# Patient Record
Sex: MALE | Race: WHITE | Employment: STUDENT | URBAN - METROPOLITAN AREA
[De-identification: names, ages, dates, MRNs, and addresses within clinical notes are randomized per-mention and may not be internally consistent; named-entity substitution may affect disease eponyms.]

---
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
## 2020-01-22 DIAGNOSIS — Q55.0 VANISHING TESTES SYNDROME: ICD-10-CM

## 2020-01-23 RX ORDER — TESTOSTERONE 16.2 MG/G
GEL TRANSDERMAL
Qty: 3 BOTTLE | Refills: 1 | Status: SHIPPED | OUTPATIENT
Start: 2020-01-23 | End: 2020-02-24 | Stop reason: ALTCHOICE

## 2020-01-24 ENCOUNTER — TELEPHONE (OUTPATIENT)
Dept: PEDIATRIC ENDOCRINOLOGY | Age: 20
End: 2020-01-24

## 2020-01-28 ENCOUNTER — TELEPHONE (OUTPATIENT)
Dept: PEDIATRIC ENDOCRINOLOGY | Age: 20
End: 2020-01-28

## 2020-02-11 LAB
FSH SERPL-ACNC: 121.4 MIU/ML (ref 1.5–12.4)
LH SERPL-ACNC: 30.4 MIU/ML (ref 1.7–8.6)
TESTOST SERPL-MCNC: 25.2 NG/DL (ref 264–916)

## 2020-02-14 ENCOUNTER — DOCUMENTATION ONLY (OUTPATIENT)
Dept: PEDIATRIC ENDOCRINOLOGY | Age: 20
End: 2020-02-14

## 2020-02-14 ENCOUNTER — TELEPHONE (OUTPATIENT)
Dept: PEDIATRIC ENDOCRINOLOGY | Age: 20
End: 2020-02-14

## 2020-02-14 NOTE — PROGRESS NOTES
Elevated LH and FSH level. Low testosterone level. Family would find out if he is applying androgel everyday. Would give me a call on Monday to discuss as well as further management plan.

## 2020-02-14 NOTE — TELEPHONE ENCOUNTER
----- Message from Diamond Estevez sent at 2/14/2020  4:04 PM EST -----  Regarding: Андрей Dia  Contact: [phone]  Mom called wanting to talk to Jun Meehan again.     Please advise [phone]

## 2020-02-18 NOTE — TELEPHONE ENCOUNTER
02/18/20  3:24 PM    Nish Carreon sent to [address] [place] [address]   Phone Number: [phone]             Pt's dad is returning Dr. Clinton Patel phone call.

## 2020-02-19 NOTE — TELEPHONE ENCOUNTER
----- Message from Star Dye sent at 2/19/2020 12:04 PM EST -----  Regarding: Rhonda   Contact: [phone]  Pt called returning office call.  Please advise [phone]

## 2020-02-24 DIAGNOSIS — Q55.0 VANISHING TESTES SYNDROME: ICD-10-CM

## 2020-02-24 DIAGNOSIS — E29.1 HYPOGONADISM IN MALE: Primary | ICD-10-CM

## 2020-03-27 ENCOUNTER — CLINICAL SUPPORT (OUTPATIENT)
Dept: PEDIATRIC ENDOCRINOLOGY | Age: 20
End: 2020-03-27

## 2020-03-27 VITALS
HEIGHT: 70 IN | HEART RATE: 65 BPM | BODY MASS INDEX: 23.02 KG/M2 | RESPIRATION RATE: 20 BRPM | DIASTOLIC BLOOD PRESSURE: 75 MMHG | SYSTOLIC BLOOD PRESSURE: 118 MMHG | WEIGHT: 160.8 LBS | TEMPERATURE: 98.2 F | OXYGEN SATURATION: 99 %

## 2020-03-27 DIAGNOSIS — Q55.0 VANISHING TESTES SYNDROME: Primary | ICD-10-CM

## 2020-03-27 NOTE — PROGRESS NOTES
Chief Complaint   Patient presents with    Injection     Patient waited 10 minutes post injection. No adverse reactions noted to injection site at the time.

## 2020-04-15 DIAGNOSIS — Q55.0 VANISHING TESTES SYNDROME: ICD-10-CM

## 2020-04-15 NOTE — TELEPHONE ENCOUNTER
Testosterone cypionate 200 mg/mL \[facility]\"" [facility] # [id number] - [facility], [facility]  [phone]    Upcoming apt 4/29/2020

## 2020-04-29 ENCOUNTER — OFFICE VISIT (OUTPATIENT)
Dept: PEDIATRIC ENDOCRINOLOGY | Age: 20
End: 2020-04-29

## 2020-04-29 ENCOUNTER — TELEPHONE (OUTPATIENT)
Dept: PEDIATRIC ENDOCRINOLOGY | Age: 20
End: 2020-04-29

## 2020-04-29 VITALS
WEIGHT: 164.6 LBS | TEMPERATURE: 98.2 F | HEIGHT: 70 IN | DIASTOLIC BLOOD PRESSURE: 87 MMHG | SYSTOLIC BLOOD PRESSURE: 137 MMHG | OXYGEN SATURATION: 99 % | BODY MASS INDEX: 23.56 KG/M2 | HEART RATE: 71 BPM | RESPIRATION RATE: 20 BRPM

## 2020-04-29 DIAGNOSIS — Q55.0 VANISHING TESTES SYNDROME: Primary | ICD-10-CM

## 2020-04-29 NOTE — PROGRESS NOTES
[address] [facility] [address] [address]  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 He was present today with his father. 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 Had surgery done for prosthesis  in 2012. Started on testosterone 4years ago. Currently on androgel 20.25mg/1.25gram(1.62%); 2 pump daily. He takes it in the morning and washes his hands after each use. Most recent bone age xray done at last clinic visit at [facility] of 17yrs 9[facility] was 15yrs. Labs done in 11/2018 were significant for normal hepatic panel, normal thyroid studies, normal testosterone and elevated LH, FSH. His last visit in endocrine clinic was on 10/14/2019. Reported fatigue whilst on AndroGel. AndroGel dose was increased gradually to 4 pump presses daily. Despite this his testosterone remained low while LH and FSH remain high. Most recent labs done on 2/7/2020 significant for low testosterone of 25.2, elevated LH of 30.4 and FSH of 121.4. We subsequently switched him to testosterone injection 200 mg monthly. He is here for his third testosterone injection. Reports an improvement in energy level since switching to testosterone injection. Aside this, he has valerie well with no ER visits, major illness or hospitalizations. Denies headache,problems with peripheral vision,constipation,heat/cold intolerance,polyuria,polydipsia. Past Medical History:   Diagnosis Date    Short for age        Social History: In college  Wants to be a doctor    Review of Systems:    A comprehensive review of systems was negative except for that written in the HPI. Medications:  Current Outpatient Medications   Medication Sig    testosterone cypionate 200 mg/mL kit Inject 200 mg (1 mL) every 30 days.     testosterone cypionate 200 mg/mL kit Inject 200mg (1 mL) every 30 days. No current facility-administered medications for this visit. Allergies: Allergies   Allergen Reactions    Dairy Aid [Lactase] Nausea and Vomiting     Complete dairy allergy    Penicillins Hives           Objective:       Visit Vitals  /87 (BP 1 Location: Left arm, BP Patient Position: Sitting)   Pulse 71   Temp 98.2 °F (36.8 °C) (Oral)   Resp 20   Ht 5' 10.12\" (1.781 m)   Wt 164 lb 9.6 oz (74.7 kg)   SpO2 99%   BMI 23.54 kg/m²       Height: 57 %ile (Z= 0.18) based on CDC (Boys, 2-20 Years) Stature-for-age data based on Stature recorded on 4/29/2020. Weight: 63 %ile (Z= 0.34) based on CDC (Boys, 2-20 Years) weight-for-age data using vitals from 4/29/2020. BMI: Body mass index is 23.54 kg/m². Percentile: 57 %ile (Z= 0.17) based on CDC (Boys, 2-20 Years) BMI-for-age based on BMI available as of 4/29/2020. Change in height: relatively unchanged in 6months  Change in weight: +12.1 kg in 6months    In general, Erik Burt is alert, well-appearing and in no acute distress. HEENT: normocephalic, atraumatic. Pupils are equal, round and reactive to light. Extraocular movements are intact, fundi are sharp bilaterally. Dentition is appropriate for age. Oropharynx is clear, mucous membranes moist. Neck is supple without lymphadenopathy. Thyroid is smooth and not enlarged. Chest: Clear to auscultation bilaterally. CV: Normal S1/S2 without murmur. Abdomen is soft, nontender, nondistended, no hepatosplenomegaly. Skin is warm, without rash or macules. Extremities are within normal. Neuro demonstrates 2+ patellar reflexes bilaterally. Sexual development: stage soco 4 PH.   Breast exam: More of lipomastia with minimal breast tissue    Laboratory data:  Results for orders placed or performed in visit on 83/00/05   FOLLICLE STIMULATING HORMONE   Result Value Ref Range    .4 (H) 1.5 - 12.4 mIU/mL   LUTEINIZING HORMONE   Result Value Ref Range    Luteinizing hormone 30.4 (H) 1.7 - 8.6 mIU/mL   TESTOSTERONE, TOTAL, FEMALE/CHILD   Result Value Ref Range    Testosterone, Serum (Total) 25.2 (L) 264.0 - 916.0 ng/dL        Assessment:       Jesica Huerta is a 23 y.o. male presenting for follow up of vanishing testes syndrome. Labs from 2/2020 showed low testosterone with elevated LH/FSH. Switch from AndroGel to testosterone injection on account of fatigue and low testosterone levels in February 2020. Currently on testosterone injection 200 mg monthly. Reports an improvement in energy level since starting testosterone injection. We will send repeat labs today. Meantime will continue testosterone injection 200 mg monthly. Will call family to discuss the results of these labs as well as further management plan. We discussed options for other topical testosterone as the hormone levels improve. We will like to see him back in clinic in 4 months or sooner if any concerns. Plan discussed with Jesica Huerta and dad who verbalized understanding. Fertility: Further discussion at next clinic visit       Plan:   Reviewed growth charts with family  Diagnosis, etiology, pathophysiology, risk/ benefits of rx, proposed eval, and expected follow up discussed with family and all questions answered    RTC in 4months or sooner if any concerns  Orders Placed This Encounter    THER/PROPH/DIAG INJECTION, SUBCUT/IM    LUTEINIZING HORMONE    TESTOSTERONE, TOTAL, FEMALE/CHILD    FOLLICLE STIMULATING HORMONE    testosterone cypionate 200 mg/mL kit     Sig: Inject 200 mg (1 mL) every 30 days.      Dispense:  1 Kit     Refill:  0     Order Specific Question:   Site     Answer:   LEFT DELTOID     Order Specific Question:   Expiration Date     Answer:   12/31/2021     Order Specific Question:   Lot#     Answer:   Q-     Order Specific Question:        Answer:   Almita Cruz     Order Specific Question:   NDC#     Answer:   [id number]     Order Specific Question:   Route     Answer:   IM         Total time: 40minutes  Time spent counseling patient/family: 50%

## 2020-04-29 NOTE — PROGRESS NOTES
Chief Complaint   Patient presents with    Other     Puberty- Injection     Patient waited 10 minutes post injection. No adverse reactions noted to injection site at the time.

## 2020-05-01 LAB
FSH SERPL-ACNC: 91.2 MIU/ML (ref 1.5–12.4)
LH SERPL-ACNC: 35.1 MIU/ML (ref 1.7–8.6)
TESTOST SERPL-MCNC: 548.8 NG/DL (ref 264–916)

## 2020-05-04 DIAGNOSIS — Q55.0 VANISHING TESTES SYNDROME: ICD-10-CM

## 2020-05-20 ENCOUNTER — CLINICAL SUPPORT (OUTPATIENT)
Dept: PEDIATRIC ENDOCRINOLOGY | Age: 20
End: 2020-05-20

## 2020-05-20 VITALS
WEIGHT: 168.4 LBS | OXYGEN SATURATION: 95 % | SYSTOLIC BLOOD PRESSURE: 123 MMHG | HEIGHT: 70 IN | BODY MASS INDEX: 24.11 KG/M2 | RESPIRATION RATE: 18 BRPM | TEMPERATURE: 97.2 F | HEART RATE: 61 BPM | DIASTOLIC BLOOD PRESSURE: 72 MMHG

## 2020-05-20 DIAGNOSIS — Q55.0 VANISHING TESTES SYNDROME: Primary | ICD-10-CM

## 2020-05-20 NOTE — PROGRESS NOTES
Chief Complaint   Patient presents with    Other     testosterone injection only      Patient tolerated well. No reactions noted at this time. Patient waited 10 min post injection.

## 2020-06-10 ENCOUNTER — CLINICAL SUPPORT (OUTPATIENT)
Dept: PEDIATRIC ENDOCRINOLOGY | Age: 20
End: 2020-06-10

## 2020-06-10 VITALS
RESPIRATION RATE: 20 BRPM | DIASTOLIC BLOOD PRESSURE: 86 MMHG | BODY MASS INDEX: 23.65 KG/M2 | HEIGHT: 70 IN | SYSTOLIC BLOOD PRESSURE: 131 MMHG | WEIGHT: 165.2 LBS | HEART RATE: 85 BPM | OXYGEN SATURATION: 99 % | TEMPERATURE: 98.3 F

## 2020-06-10 DIAGNOSIS — Q55.0 VANISHING TESTES SYNDROME: Primary | ICD-10-CM

## 2020-06-10 NOTE — PROGRESS NOTES
Chief Complaint   Patient presents with    Injection     Patient waited 15 minutes post injection. No adverse reaction noted to injection site at the time.

## 2020-06-24 ENCOUNTER — HOSPITAL ENCOUNTER (EMERGENCY)
Age: 20
Discharge: HOME | End: 2020-06-25
Attending: EMERGENCY MEDICINE
Payer: COMMERCIAL

## 2020-06-24 DIAGNOSIS — S93.601A SPRAIN OF RIGHT FOOT, INITIAL ENCOUNTER: ICD-10-CM

## 2020-06-24 DIAGNOSIS — M79.671 RIGHT FOOT PAIN: Primary | ICD-10-CM

## 2020-06-24 PROCEDURE — 99283 EMERGENCY DEPT VISIT LOW MDM: CPT

## 2020-06-24 RX ORDER — ACETAMINOPHEN 500 MG
1000 TABLET ORAL
Status: COMPLETED | OUTPATIENT
Start: 2020-06-25 | End: 2020-06-25

## 2020-06-24 RX ORDER — IBUPROFEN 400 MG/1
400 TABLET ORAL
Status: COMPLETED | OUTPATIENT
Start: 2020-06-25 | End: 2020-06-25

## 2020-06-25 ENCOUNTER — APPOINTMENT (OUTPATIENT)
Dept: GENERAL RADIOLOGY | Age: 20
End: 2020-06-25
Attending: EMERGENCY MEDICINE
Payer: COMMERCIAL

## 2020-06-25 VITALS
DIASTOLIC BLOOD PRESSURE: 80 MMHG | RESPIRATION RATE: 20 BRPM | SYSTOLIC BLOOD PRESSURE: 140 MMHG | TEMPERATURE: 98 F | HEART RATE: 70 BPM | OXYGEN SATURATION: 100 %

## 2020-06-25 PROCEDURE — 73630 X-RAY EXAM OF FOOT: CPT

## 2020-06-25 PROCEDURE — 74011250637 HC RX REV CODE- 250/637: Performed by: EMERGENCY MEDICINE

## 2020-06-25 PROCEDURE — 74011250637 HC RX REV CODE- 250/637

## 2020-06-25 RX ORDER — IBUPROFEN 400 MG/1
TABLET ORAL
Status: COMPLETED
Start: 2020-06-25 | End: 2020-06-25

## 2020-06-25 RX ORDER — ACETAMINOPHEN 500 MG
TABLET ORAL
Status: DISCONTINUED
Start: 2020-06-25 | End: 2020-06-25 | Stop reason: HOSPADM

## 2020-06-25 RX ORDER — IBUPROFEN 200 MG
TABLET ORAL
Status: DISCONTINUED
Start: 2020-06-25 | End: 2020-06-25 | Stop reason: HOSPADM

## 2020-06-25 RX ADMIN — IBUPROFEN 400 MG: 400 TABLET, FILM COATED ORAL at 00:10

## 2020-06-25 RX ADMIN — ACETAMINOPHEN 1000 MG: 500 TABLET ORAL at 00:07

## 2020-06-25 RX ADMIN — IBUPROFEN 400 MG: 400 TABLET ORAL at 00:10

## 2020-06-25 NOTE — ED TRIAGE NOTES
C/o of R. Foot pain R. Lateral post tripping over family dog. ? Swelling R. Lateral side of R. Foot. Pt. Ambulatory with slight limp.

## 2020-06-25 NOTE — ED PROVIDER NOTES
40-year-old male presenting to the ER with complaint of right foot pain. Patient reports before presenting to the ER he was tripped up by his dog causing him to hyper plantarflex the foot causing severe pain in the middle and lateral midfoot. Patient reports worsened pain with palpation and bearing weight. No numbness tingling weakness. No previous injuries. Patient denies any ankle pain or lower leg pain.   She took 400 mg of Motrin prior to arrival.           Past Medical History:   Diagnosis Date    Short for age        Past Surgical History:   Procedure Laterality Date    COLONOSCOPY N/A 11/21/2018    COLONOSCOPY performed by Miriam Li MD at [address] HX OTHER SURGICAL  4/2/13    sinus sx    HX OTHER SURGICAL      implant         Family History:   Problem Relation Age of Onset    No Known Problems Mother     No Known Problems Father     Hypertension Maternal Grandfather     Heart Disease Paternal Grandfather     Asthma Brother     Cancer Paternal Grandmother        Social History     Socioeconomic History    Marital status: SINGLE     Spouse name: Not on file    Number of children: Not on file    Years of education: Not on file    Highest education level: Not on file   Occupational History    Not on file   Social Needs    Financial resource strain: Not on file    Food insecurity     Worry: Not on file     Inability: Not on file    Transportation needs     Medical: Not on file     Non-medical: Not on file   Tobacco Use    Smoking status: Never Smoker    Smokeless tobacco: Never Used   Substance and Sexual Activity    Alcohol use: No    Drug use: No    Sexual activity: Never   Lifestyle    Physical activity     Days per week: Not on file     Minutes per session: Not on file    Stress: Not on file   Relationships    Social connections     Talks on phone: Not on file     Gets together: Not on file     Attends [employer] service: Not on file     Active member of club or organization: Not on file     Attends meetings of clubs or organizations: Not on file     Relationship status: Not on file    Intimate partner violence     Fear of current or ex partner: Not on file     Emotionally abused: Not on file     Physically abused: Not on file     Forced sexual activity: Not on file   Other Topics Concern    Not on file   Social History Narrative    Not on file         ALLERGIES: Dairy aid [lactase] and Penicillins    Review of Systems   Musculoskeletal: Positive for joint swelling. Neurological: Negative for weakness and numbness. All other systems reviewed and are negative. Vitals:    06/24/20 2359   BP: 140/80   Pulse: 70   Resp: 20   Temp: 98 °F (36.7 °C)   SpO2: 100%            Physical Exam  Constitutional:       General: He is not in acute distress. Appearance: Normal appearance. HENT:      Head: Normocephalic. Nose: Nose normal.   Eyes:      Conjunctiva/sclera: Conjunctivae normal.   Neck:      Musculoskeletal: Neck supple. Cardiovascular:      Rate and Rhythm: Normal rate. Pulmonary:      Effort: Pulmonary effort is normal. No respiratory distress. Musculoskeletal:         General: Tenderness and signs of injury present. Right ankle: He exhibits normal range of motion, no swelling, no ecchymosis, no deformity and normal pulse. No tenderness. No lateral malleolus, no medial malleolus, no AITFL, no CF ligament, no posterior TFL and no proximal fibula tenderness found. Achilles tendon normal.      Right lower leg: Normal.      Right foot: Tenderness, bony tenderness and swelling present. No laceration. Feet:    Skin:     General: Skin is warm. Neurological:      General: No focal deficit present. Mental Status: He is alert. MDM  Number of Diagnoses or Management Options  Right foot pain:   Sprain of right foot, initial encounter:   Diagnosis management comments: Mechanism of injury consistent with likely sprain.   X-ray negative for any acute fractures. Ace wrap the area discussed rest, ice, elevation and compression. Discussed Tylenol Motrin for pain relief. Given follow-up information for orthopedics if pain does not improve after swelling has resolved. Amount and/or Complexity of Data Reviewed  Tests in the radiology section of CPT®: reviewed           Procedures      No results found for this or any previous visit (from the past 24 hour(s)). Xr Foot Rt Min 3 V    Result Date: 6/25/2020  EXAM: XR FOOT RT MIN 3 V INDICATION: injury. Pain in the lateral foot COMPARISON: None. FINDINGS: Three views of the right foot demonstrate no fracture or other acute osseous or articular abnormality. The soft tissues are within normal limits. IMPRESSION: No acute abnormality.

## 2020-06-25 NOTE — ED NOTES
I have reviewed discharge instructions with the patient. The patient verbalized understanding. Ace wrap applied to R. Foot/ankle. Pt ambulatory to vehicle. nad.

## 2020-07-15 ENCOUNTER — TELEPHONE (OUTPATIENT)
Dept: PEDIATRIC ENDOCRINOLOGY | Age: 20
End: 2020-07-15

## 2020-07-15 NOTE — TELEPHONE ENCOUNTER
----- Message from Heriberto Serrato sent at 7/15/2020  9:23 AM EDT -----  Regarding: Dr. Aceves Mess: [phone]  Dad called , had to cancel appt for today for injection for the patient. Dad would like a call back to reschedule for another day. Dad can be reached at [phone]. Does he need to see you too?

## 2020-07-17 ENCOUNTER — TELEPHONE (OUTPATIENT)
Dept: PEDIATRIC ENDOCRINOLOGY | Age: 20
End: 2020-07-17

## 2020-07-17 NOTE — TELEPHONE ENCOUNTER
Father will be giving testosterone injection at home- Sydney Ardon aware. Father reported they have a 23G needle. Parents aware of needle being slightly bigger than the one we use but will be fine for an IM injection. Father will call back if he has any questions/ concerns.

## 2020-07-17 NOTE — TELEPHONE ENCOUNTER
----- Message from Ny Simon sent at 7/17/2020 10:03 AM EDT -----  Regarding: Kelly Portillo: [phone]  Dad called to speak with nurse regarding needing to know what kind of needle needs to be used to administer testosterone injection.  please advise [phone]

## 2020-09-20 DIAGNOSIS — Q55.0 VANISHING TESTES SYNDROME: ICD-10-CM

## 2020-09-21 RX ORDER — TESTOSTERONE CYPIONATE 200 MG/ML
INJECTION INTRAMUSCULAR
Qty: 1 ML | Refills: 1 | Status: SHIPPED | OUTPATIENT
Start: 2020-09-21 | End: 2020-10-20 | Stop reason: SDUPTHER

## 2020-10-20 DIAGNOSIS — Q55.0 VANISHING TESTES SYNDROME: ICD-10-CM

## 2020-10-20 RX ORDER — TESTOSTERONE CYPIONATE 200 MG/ML
INJECTION INTRAMUSCULAR
Qty: 1 ML | Refills: 1 | Status: SHIPPED | OUTPATIENT
Start: 2020-10-20 | End: 2020-12-21 | Stop reason: SDUPTHER

## 2020-12-21 DIAGNOSIS — Q55.0 VANISHING TESTES SYNDROME: ICD-10-CM

## 2020-12-21 RX ORDER — TESTOSTERONE CYPIONATE 200 MG/ML
INJECTION INTRAMUSCULAR
Qty: 1 ML | Refills: 2 | Status: SHIPPED | OUTPATIENT
Start: 2020-12-21 | End: 2021-01-15 | Stop reason: SDUPTHER

## 2021-01-15 ENCOUNTER — TELEPHONE (OUTPATIENT)
Dept: PEDIATRIC ENDOCRINOLOGY | Age: 21
End: 2021-01-15

## 2021-01-15 DIAGNOSIS — Q55.0 VANISHING TESTES SYNDROME: ICD-10-CM

## 2021-01-15 RX ORDER — PEN NEEDLE, DIABETIC 31 GX3/16"
NEEDLE, DISPOSABLE MISCELLANEOUS
Qty: 10 PEN NEEDLE | Refills: 1 | Status: SHIPPED | OUTPATIENT
Start: 2021-01-15

## 2021-01-15 RX ORDER — TESTOSTERONE CYPIONATE 200 MG/ML
INJECTION INTRAMUSCULAR
Qty: 1 ML | Refills: 2 | Status: SHIPPED | OUTPATIENT
Start: 2021-01-15 | End: 2021-04-22 | Stop reason: SDUPTHER

## 2021-01-15 NOTE — TELEPHONE ENCOUNTER
----- Message from Blake Rock sent at 1/15/2021 12:43 PM EST -----  Regarding: Evelyn Webber: [phone]  Zaira called for PA for testosterone cypionate (DEPOTESTOTERONE CYPIONATE) 200 mg/mL injection [[id number]] going to [place] [address] #[id number] - [facility], [address] [place] [address]   Phone:  [phone]  Fax:  [phone]   ROQUE #:  [id number]      Please also include order for needles. Please call azira when completed [phone]    New insurance added in chart.

## 2021-01-15 NOTE — TELEPHONE ENCOUNTER
PA pending via [facility]. Key: [id number] - PA Case ID: [id number] - Rx #: [id number].

## 2021-04-22 DIAGNOSIS — Q55.0 VANISHING TESTES SYNDROME: ICD-10-CM

## 2021-04-22 RX ORDER — TESTOSTERONE CYPIONATE 200 MG/ML
INJECTION INTRAMUSCULAR
Qty: 1 ML | Refills: 2 | Status: SHIPPED | OUTPATIENT
Start: 2021-04-22 | End: 2021-07-19

## 2021-04-22 NOTE — TELEPHONE ENCOUNTER
Dad called requesting a refill on the pt's  Testosterone medication. Dad would also like a lab slip sent to him for the brother Chika Soler-03/19/2007.

## 2021-07-19 DIAGNOSIS — Q55.0 VANISHING TESTES SYNDROME: ICD-10-CM

## 2021-07-19 RX ORDER — TESTOSTERONE CYPIONATE 200 MG/ML
INJECTION INTRAMUSCULAR
Qty: 1 ML | Refills: 3 | Status: SHIPPED | OUTPATIENT
Start: 2021-07-19 | End: 2021-09-20

## 2021-09-20 DIAGNOSIS — Q55.0 VANISHING TESTES SYNDROME: ICD-10-CM

## 2021-09-20 RX ORDER — TESTOSTERONE CYPIONATE 200 MG/ML
INJECTION INTRAMUSCULAR
Qty: 1 ML | Refills: 3 | Status: SHIPPED | OUTPATIENT
Start: 2021-09-20 | End: 2021-10-22 | Stop reason: SDUPTHER

## 2021-09-27 ENCOUNTER — TELEPHONE (OUTPATIENT)
Dept: PEDIATRIC GASTROENTEROLOGY | Age: 21
End: 2021-09-27

## 2021-09-27 NOTE — TELEPHONE ENCOUNTER
Per Ewelina Rubalcava, patient needing to be seen. Mother reported patient being at [facility] appt scheduled for 10/4. Family verbalized understanding.

## 2021-09-27 NOTE — TELEPHONE ENCOUNTER
Mom Margo Mendieta called to speak with nurse regarding pt testosterone shot pt is having a bad face break out. Please advise [phone].

## 2021-10-04 ENCOUNTER — VIRTUAL VISIT (OUTPATIENT)
Dept: PEDIATRIC ENDOCRINOLOGY | Age: 21
End: 2021-10-04
Payer: COMMERCIAL

## 2021-10-04 DIAGNOSIS — Q55.0 VANISHING TESTES SYNDROME: Primary | ICD-10-CM

## 2021-10-04 PROCEDURE — 99215 OFFICE O/P EST HI 40 MIN: CPT | Performed by: STUDENT IN AN ORGANIZED HEALTH CARE EDUCATION/TRAINING PROGRAM

## 2021-10-04 RX ORDER — TRETINOIN 0.25 MG/G
CREAM TOPICAL
COMMUNITY
Start: 2021-07-19

## 2021-10-04 NOTE — PROGRESS NOTES
Identified pt with two pt identifiers(name and ). Reviewed record in preparation for visit and have obtained necessary documentation. Chief Complaint   Patient presents with    Follow-up   Patient reported acne for the last year- has increased in the last couple months. Last Testosterone injection: ~2 week ago. Health Maintenance Due   Topic    Hepatitis C Screening     HPV Age 9Y-34Y (3 - Male 2-dose series)    COVID-19 Vaccine (1)    DTaP/Tdap/Td series (1 - Tdap)    Flu Vaccine (1)      There were no vitals taken for this visit. Pain Scale: /10    Coordination of Care Questionnaire:  :   1. Have you been to the ER, urgent care clinic since your last visit? Hospitalized since your last visit? No    2. Have you seen or consulted any other health care providers outside of the [address] [place] [address] since your last visit? Include any pap smears or colon screening.  No

## 2021-10-04 NOTE — PROGRESS NOTES
Ciara Singleton is a 24 y.o. male who was seen by synchronous (real-time) audio-video technology on 10/4/2021 for Follow-up        Assessment & Plan:   Diagnoses and all orders for this visit:    1. Vanishing testes syndrome  -     FOLLICLE STIMULATING HORMONE; Future  -     LUTEINIZING HORMONE; Future  -     TESTOSTERONE, TOTAL, FEMALE/CHILD; Future  -     CBC WITH AUTOMATED DIFF; Future  -     METABOLIC PANEL, COMPREHENSIVE; Future    oZe Garcia is a 24 y.o. male presenting for follow up of vanishing testes syndrome. Labs from 2/2020 showed low testosterone with elevated LH/FSH. Switch from AndroGel to testosterone injection on account of fatigue and low testosterone levels in February 2020. Currently on testosterone injection 200 mg monthly. Reports an improvement in energy level since starting testosterone injection. We will send repeat labs today to be done few days before the next testosterone injection. We will give him a call to discuss the results as well as further management plan. Meantime will continue testosterone injection 200 mg monthly. We discussed options for other topical testosterone as the hormone levels improve. We will like to see him back in clinic in 4 months or sooner if any concerns. We discussed adult endocrinology follow-up normal he is turned 21yrs. Plan discussed with Zoe Garcia who verbalized understanding. We will discuss fertility options prior to transitioning to adult endocrinology. I spent at least 40 minutes on this visit with this established patient. 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 He was present today alone     Mum reports he was diagnosed with vanishing testes at 10onths of age. Had surgery done for prosthesis  in 2012. Started on testosterone 4years ago.  Was on androgel 20.25mg/1.25gram(1.62%); 2 pump daily. Bone age xray done at last clinic visit at [facility] of 17yrs 9[facility] was 15yrs. Labs done in 11/2018 were significant for normal hepatic panel, normal thyroid studies, normal testosterone and elevated LH, FSH. He was transitioned to AndroGel when he turned 18 years. Initially was on 2 pump presses. Reported fatigue. AndroGel dose was increased gradually to 4 pump presses daily. Despite this his testosterone remained low while LH and FSH remain high. Labs done on 2/7/2020 significant for low testosterone of 25.2, elevated LH of 30.4 and FSH of 121.4. We subsequently switched him to testosterone injection 200 mg monthly.      His last visit in endocrine clinic was on 4/29/2020. Labs done on 4/30/2020 significant for improved testosterone as well as [address] level. Was seen in the ER in June 2020 for leg pain. Aside this, he has valerie well with no ER visits, major illness or hospitalizations. Currently on testosterone 200 mg every 30 days. Reports improvement in energy levels. Denies headache,problems with peripheral vision,constipation,heat/cold intolerance,polyuria,polydipsia.        Prior to Admission medications    Medication Sig Start Date End Date Taking? Authorizing Provider   tretinoin (RETIN-A) 0.025 % topical cream APPLY TOPICALLY TO FACE EVERY DAY AT BEDTIME 7/19/21  Yes Provider, Historical   testosterone cypionate (DEPOTESTOTERONE CYPIONATE) 200 mg/mL injection ADMINISTER 1 ML IN THE MUSCLE EVERY 30 DAYS 9/20/21  Yes Cedric Lee MD   Insulin Needles, Disposable, (Karla Pen Needle) 32 gauge x 5/32\" ndle Use to inject testosterone once every 30 days.  1/15/21  Yes Cedric Lee MD     Patient Active Problem List   Diagnosis Code    Vanishing testes syndrome Q55.0    Unspecified sinusitis (chronic) J32.9    Gynecomastia, male N58    Weight loss R63.4     Patient Active Problem List    Diagnosis Date Noted    Weight loss 11/16/2018    Gynecomastia, male 06/26/2018    Unspecified sinusitis (chronic) 04/02/2013    Vanishing testes syndrome 03/18/2013     Current Outpatient Medications   Medication Sig Dispense Refill    tretinoin (RETIN-A) 0.025 % topical cream APPLY TOPICALLY TO FACE EVERY DAY AT BEDTIME      testosterone cypionate (DEPOTESTOTERONE CYPIONATE) 200 mg/mL injection ADMINISTER 1 ML IN THE MUSCLE EVERY 30 DAYS 1 mL 3    Insulin Needles, Disposable, (Karla Pen Needle) 32 gauge x 5/32\" ndle Use to inject testosterone once every 30 days.  10 Pen Needle 1     Allergies   Allergen Reactions    Dairy Aid [Lactase] Nausea and Vomiting     Complete dairy allergy    Penicillins Hives     Past Medical History:   Diagnosis Date    Short for age      Past Surgical History:   Procedure Laterality Date    COLONOSCOPY N/A 11/21/2018    COL[facility]COPY    HX OTHER SURGICAL  4/2/13    sinus sx    HX OTHER SURGICAL      implant     Family History   Problem Relation Age of Onset    No Known Problems Mother     No Known Problems Father     Hypertension Maternal Grandfather     Heart Disease Paternal Grandfather     Asthma Brother     Cancer Paternal Grandmother        ROS  As above  Objective:     Patient-Reported Vitals 10/4/2021   Patient-Reported Weight 160lbs        [INSTRUCTIONS:  \"[x]\" Indicates a positive item  \"[]\" Indicates a negative item  -- DELETE ALL ITEMS NOT EXAMINED]    Constitutional: [x] Appears well-developed and well-nourished [x] No apparent distress      [] Abnormal -     Mental status: [x] Alert and awake  [x] Oriented to person/place/time [x] Able to follow commands    [] Abnormal -     Eyes:   EOM    [x]  Normal    [] Abnormal -   Sclera  [x]  Normal    [] Abnormal -          Discharge [x]  None visible   [] Abnormal -     HENT: [x] Normocephalic, atraumatic  [] Abnormal -   [x] Mouth/Throat: Mucous membranes are moist    External Ears [x] Normal  [] Abnormal -    Neck: [x] No visualized mass [] Abnormal -     Pulmonary/Chest: [x] Respiratory effort normal   [x] No visualized signs of difficulty breathing or respiratory distress        [] Abnormal -      Musculoskeletal:   [x] Normal gait with no signs of ataxia         [x] Normal range of motion of neck        [] Abnormal -     Neurological:        [x] No Facial Asymmetry (Cranial nerve 7 motor function) (limited exam due to video visit)          [x] No gaze palsy        [] Abnormal -          Skin:        [x] No significant exanthematous lesions or discoloration noted on facial skin         [] Abnormal -            Psychiatric:       [x] Normal Affect [] Abnormal -        [x] No Hallucinations    Other pertinent observable physical exam findings:-        We discussed the expected course, resolution and complications of the diagnosis(es) in detail. Medication risks, benefits, costs, interactions, and alternatives were discussed as indicated. I advised him to contact the office if his condition worsens, changes or fails to improve as anticipated. He expressed understanding with the diagnosis(es) and plan. Fernanda Hill, was evaluated through a synchronous (real-time) audio-video encounter. The patient (or guardian if applicable) is aware that this is a billable service. Verbal consent to proceed has been obtained within the past 12 months. The visit was conducted pursuant to the emergency declaration under the [facility], [address] [place] [address] authority and the [employer] General Act. Patient identification was verified, and a caregiver was present when appropriate. The patient was located in a state where the provider was credentialed to provide care.       Erich Donahue MD

## 2021-10-15 LAB
ALBUMIN SERPL-MCNC: 5 G/DL (ref 4.1–5.2)
ALBUMIN/GLOB SERPL: 2.1 {RATIO} (ref 1.2–2.2)
ALP SERPL-CCNC: 84 IU/L (ref 44–121)
ALT SERPL-CCNC: 21 IU/L (ref 0–44)
AST SERPL-CCNC: 22 IU/L (ref 0–40)
BASOPHILS # BLD AUTO: 0 X10E3/UL (ref 0–0.2)
BASOPHILS NFR BLD AUTO: 0 %
BILIRUB SERPL-MCNC: 0.4 MG/DL (ref 0–1.2)
BUN SERPL-MCNC: 7 MG/DL (ref 6–20)
BUN/CREAT SERPL: 10 (ref 9–20)
CALCIUM SERPL-MCNC: 9.9 MG/DL (ref 8.7–10.2)
CHLORIDE SERPL-SCNC: 106 MMOL/L (ref 96–106)
CO2 SERPL-SCNC: 23 MMOL/L (ref 20–29)
CREAT SERPL-MCNC: 0.67 MG/DL (ref 0.76–1.27)
EOSINOPHIL # BLD AUTO: 0.2 X10E3/UL (ref 0–0.4)
EOSINOPHIL NFR BLD AUTO: 3 %
ERYTHROCYTE [DISTWIDTH] IN BLOOD BY AUTOMATED COUNT: 13.1 % (ref 11.6–15.4)
FSH SERPL-ACNC: 110 MIU/ML (ref 1.5–12.4)
GLOBULIN SER CALC-MCNC: 2.4 G/DL (ref 1.5–4.5)
GLUCOSE SERPL-MCNC: 92 MG/DL (ref 65–99)
HCT VFR BLD AUTO: 46.6 % (ref 37.5–51)
HGB BLD-MCNC: 15.1 G/DL (ref 13–17.7)
IMM GRANULOCYTES # BLD AUTO: 0 X10E3/UL (ref 0–0.1)
IMM GRANULOCYTES NFR BLD AUTO: 1 %
LH SERPL-ACNC: 44.4 MIU/ML (ref 1.7–8.6)
LYMPHOCYTES # BLD AUTO: 2.3 X10E3/UL (ref 0.7–3.1)
LYMPHOCYTES NFR BLD AUTO: 30 %
MCH RBC QN AUTO: 30.3 PG (ref 26.6–33)
MCHC RBC AUTO-ENTMCNC: 32.4 G/DL (ref 31.5–35.7)
MCV RBC AUTO: 94 FL (ref 79–97)
MONOCYTES # BLD AUTO: 0.7 X10E3/UL (ref 0.1–0.9)
MONOCYTES NFR BLD AUTO: 10 %
NEUTROPHILS # BLD AUTO: 4.2 X10E3/UL (ref 1.4–7)
NEUTROPHILS NFR BLD AUTO: 56 %
PLATELET # BLD AUTO: 235 X10E3/UL (ref 150–450)
POTASSIUM SERPL-SCNC: 4.1 MMOL/L (ref 3.5–5.2)
PROT SERPL-MCNC: 7.4 G/DL (ref 6–8.5)
RBC # BLD AUTO: 4.98 X10E6/UL (ref 4.14–5.8)
SODIUM SERPL-SCNC: 143 MMOL/L (ref 134–144)
TESTOST SERPL-MCNC: 187 NG/DL (ref 264–916)
WBC # BLD AUTO: 7.5 X10E3/UL (ref 3.4–10.8)

## 2021-10-22 DIAGNOSIS — Q55.0 VANISHING TESTES SYNDROME: Primary | ICD-10-CM

## 2021-10-22 DIAGNOSIS — Q55.0 VANISHING TESTES SYNDROME: ICD-10-CM

## 2021-10-22 RX ORDER — TESTOSTERONE CYPIONATE 200 MG/ML
INJECTION INTRAMUSCULAR
Qty: 2 ML | Refills: 3 | Status: SHIPPED | OUTPATIENT
Start: 2021-10-22 | End: 2022-01-17 | Stop reason: SDUPTHER

## 2021-10-22 NOTE — TELEPHONE ENCOUNTER
Elevated LH and FSH level. Low normal testosterone level. Plan will be to increase testosterone dose to 200 mg every 3 weeks. Plan for repeat labs in 2 months or sooner if any concerns.

## 2021-12-24 LAB
ALBUMIN SERPL-MCNC: 5.1 G/DL (ref 4.1–5.2)
ALBUMIN/GLOB SERPL: 2 {RATIO} (ref 1.2–2.2)
ALP SERPL-CCNC: 85 IU/L (ref 44–121)
ALT SERPL-CCNC: 21 IU/L (ref 0–44)
AST SERPL-CCNC: 26 IU/L (ref 0–40)
BASOPHILS # BLD AUTO: 0.1 X10E3/UL (ref 0–0.2)
BASOPHILS NFR BLD AUTO: 1 %
BILIRUB SERPL-MCNC: 0.4 MG/DL (ref 0–1.2)
BUN SERPL-MCNC: 11 MG/DL (ref 6–20)
BUN/CREAT SERPL: 15 (ref 9–20)
CALCIUM SERPL-MCNC: 10.1 MG/DL (ref 8.7–10.2)
CHLORIDE SERPL-SCNC: 104 MMOL/L (ref 96–106)
CO2 SERPL-SCNC: 26 MMOL/L (ref 20–29)
CREAT SERPL-MCNC: 0.73 MG/DL (ref 0.76–1.27)
EOSINOPHIL # BLD AUTO: 0.4 X10E3/UL (ref 0–0.4)
EOSINOPHIL NFR BLD AUTO: 6 %
ERYTHROCYTE [DISTWIDTH] IN BLOOD BY AUTOMATED COUNT: 12.4 % (ref 11.6–15.4)
FSH SERPL-ACNC: 109 MIU/ML (ref 1.5–12.4)
GLOBULIN SER CALC-MCNC: 2.6 G/DL (ref 1.5–4.5)
GLUCOSE SERPL-MCNC: 74 MG/DL (ref 65–99)
HCT VFR BLD AUTO: 42.4 % (ref 37.5–51)
HGB BLD-MCNC: 14.5 G/DL (ref 13–17.7)
IMM GRANULOCYTES # BLD AUTO: 0 X10E3/UL (ref 0–0.1)
IMM GRANULOCYTES NFR BLD AUTO: 0 %
LH SERPL-ACNC: 39.8 MIU/ML (ref 1.7–8.6)
LYMPHOCYTES # BLD AUTO: 2 X10E3/UL (ref 0.7–3.1)
LYMPHOCYTES NFR BLD AUTO: 29 %
MCH RBC QN AUTO: 31.5 PG (ref 26.6–33)
MCHC RBC AUTO-ENTMCNC: 34.2 G/DL (ref 31.5–35.7)
MCV RBC AUTO: 92 FL (ref 79–97)
MONOCYTES # BLD AUTO: 0.7 X10E3/UL (ref 0.1–0.9)
MONOCYTES NFR BLD AUTO: 11 %
NEUTROPHILS # BLD AUTO: 3.5 X10E3/UL (ref 1.4–7)
NEUTROPHILS NFR BLD AUTO: 53 %
PLATELET # BLD AUTO: 278 X10E3/UL (ref 150–450)
POTASSIUM SERPL-SCNC: 3.7 MMOL/L (ref 3.5–5.2)
PROT SERPL-MCNC: 7.7 G/DL (ref 6–8.5)
RBC # BLD AUTO: 4.61 X10E6/UL (ref 4.14–5.8)
SODIUM SERPL-SCNC: 143 MMOL/L (ref 134–144)
TESTOST SERPL-MCNC: 108 NG/DL (ref 264–916)
WBC # BLD AUTO: 6.7 X10E3/UL (ref 3.4–10.8)

## 2022-01-17 DIAGNOSIS — Q55.0 VANISHING TESTES SYNDROME: ICD-10-CM

## 2022-01-17 RX ORDER — TESTOSTERONE CYPIONATE 200 MG/ML
INJECTION INTRAMUSCULAR
Qty: 2 ML | Refills: 4 | Status: SHIPPED | OUTPATIENT
Start: 2022-01-17 | End: 2022-07-15 | Stop reason: SDUPTHER

## 2022-02-10 ENCOUNTER — TELEPHONE (OUTPATIENT)
Dept: PEDIATRIC GASTROENTEROLOGY | Age: 22
End: 2022-02-10

## 2022-02-10 NOTE — TELEPHONE ENCOUNTER
Joaquin Charlton called to speak with nurse regarding a PA needed for  testosterone      Please advise [phone]

## 2022-02-10 NOTE — TELEPHONE ENCOUNTER
Testosterone CYP 200mg/mL submitted via CMM. Key: [id number] - PA Case ID: [id number]  23- 48 hour turn around time. Father made aware.     BIN: [id number]  PCN: [id number]  GROUP: [id number]  ID: [id number]

## 2022-03-20 PROBLEM — R63.4 WEIGHT LOSS: Status: ACTIVE | Noted: 2018-11-16

## 2022-03-20 PROBLEM — N62 GYNECOMASTIA, MALE: Status: ACTIVE | Noted: 2018-06-26

## 2022-07-15 DIAGNOSIS — Q55.0 VANISHING TESTES SYNDROME: ICD-10-CM

## 2022-07-15 RX ORDER — TESTOSTERONE CYPIONATE 200 MG/ML
INJECTION INTRAMUSCULAR
Qty: 2 ML | Refills: 4 | Status: SHIPPED | OUTPATIENT
Start: 2022-07-15

## 2023-02-13 DIAGNOSIS — Q55.0 VANISHING TESTES SYNDROME: ICD-10-CM

## 2023-02-14 ENCOUNTER — VIRTUAL VISIT (OUTPATIENT)
Dept: PEDIATRIC ENDOCRINOLOGY | Age: 23
End: 2023-02-14
Payer: COMMERCIAL

## 2023-02-14 DIAGNOSIS — Q55.0 VANISHING TESTES SYNDROME: Primary | ICD-10-CM

## 2023-02-14 DIAGNOSIS — Q55.0 VANISHING TESTES SYNDROME: ICD-10-CM

## 2023-02-14 PROCEDURE — 99214 OFFICE O/P EST MOD 30 MIN: CPT | Performed by: STUDENT IN AN ORGANIZED HEALTH CARE EDUCATION/TRAINING PROGRAM

## 2023-02-14 RX ORDER — TESTOSTERONE CYPIONATE 200 MG/ML
INJECTION, SOLUTION INTRAMUSCULAR
Qty: 2 ML | OUTPATIENT
Start: 2023-02-14

## 2023-02-14 RX ORDER — TESTOSTERONE CYPIONATE 200 MG/ML
INJECTION, SOLUTION INTRAMUSCULAR
Qty: 2 ML | Refills: 1 | Status: SHIPPED | OUTPATIENT
Start: 2023-02-14

## 2023-02-14 NOTE — PROGRESS NOTES
Uriah Delacruz is a 25 y.o. male who was seen by synchronous (real-time) audio-video technology on 2/14/2023 for Follow-up (Puberty)        Assessment & Plan:   Diagnoses and all orders for this visit:    1. Vanishing testes syndrome  -     LUTEINIZING HORMONE; Future  -     FOLLICLE STIMULATING HORMONE; Future  -     TESTOSTERONE, FREE & TOTAL; Future  -     CBC WITH AUTOMATED DIFF; Future  -     METABOLIC PANEL, COMPREHENSIVE; Future    Natalia Martinez is a 22y. o. male presenting for follow up of vanishing testes syndrome. Labs from 12/2021 showed low normal testosterone with elevated LH/FSH. Switched from AndroGel to testosterone injection on account of fatigue and low testosterone levels in February 2020. Currently on testosterone injection 200 mg every 2 weeks. Denies any tiredness, headaches, muscle aches. Run out of medications 2 weeks ago. Insurance denying testosterone injection refill pending recent office visit as well as labs. Stressed the importance of regular outpatient clinic follow-ups. We will send repeat labs today. We will give him a call to discuss the results as well as further management plan. Meantime will continue testosterone injection 200 mg monthly. We discussed options for other topical testosterone as the hormone levels improve. Natalia Martinez will like to continue injections for now. We discussed adult endocrinology follow-up as he will 23 years in a few months. .  Plan discussed with Natalia Martinez who verbalized understanding. I spent at least 30 minutes on this visit with this established patient. RTC in 6 months/adult endocrine follow-up  Subjective:   CC: F/U for Vanishing testes syndrome on testosterone injection     History of present illness:  Natalia Martinez is  now 22y. o. 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 He was present today alone     Mum reports he was diagnosed with vanishing testes at 10onths of age. Had surgery done for prosthesis  in 2012. Started on testosterone 4years ago. Was on androgel 20.25mg/1.25gram(1.62%); 2 pump daily. Bone age xray done at last clinic visit at [facility] of 17yrs 9[facility] was 15yrs. Labs done in 11/2018 were significant for normal hepatic panel, normal thyroid studies, normal testosterone and elevated LH, FSH. He was transitioned to AndroGel when he turned 18 years. Initially was on 2 pump presses. Reported fatigue. AndroGel dose was increased gradually to 4 pump presses daily. Despite this his testosterone remained low while LH and FSH remain high. Labs done on 2/7/2020 significant for low testosterone of 25.2, elevated LH of 30.4 and FSH of 121.4. We subsequently switched him to testosterone injection 200 mg monthly. His last visit in endocrine clinic was on 10/4/2021. Labs done in December 2021 significant for low normal testosterone with elevated FSH/LH. Since then, he has been well with no ER visits, major illness or hospitalizations. Currently on testosterone 200 mg every 14 days for vanishing testes syndrome. Reports improvement in energy levels. Denies headache,problems with peripheral vision,constipation,heat/cold intolerance,polyuria,polydipsia. 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 Authorizing Provider   testosterone cypionate (DEPOTESTOTERONE CYPIONATE) 200 mg/mL injection 1ml every 14days 2/14/23  Yes Carmela Fitzpatrick MD   tretinoin (RETIN-A) 0.025 % topical cream APPLY TOPICALLY TO FACE EVERY DAY AT BEDTIME 7/19/21  Yes Provider, Historical   Insulin Needles, Disposable, (Karla Pen Needle) 32 gauge x 5/32\" ndle Use to inject testosterone once every 30 days.  1/15/21  Yes Carmela Fitzpatrick MD     Patient Active Problem List   Diagnosis Code    Vanishing testes syndrome Q55.0    Unspecified sinusitis (chronic) J32.9    Gynecomastia, male N62    Weight loss R63.4     Patient Active Problem List    Diagnosis Date Noted    Weight loss 11/16/2018    Gynecomastia, male 06/26/2018    Unspecified sinusitis (chronic) 04/02/2013    Vanishing testes syndrome 03/18/2013     Current Outpatient Medications   Medication Sig Dispense Refill    testosterone cypionate (DEPOTESTOTERONE CYPIONATE) 200 mg/mL injection 1ml every 14days 2 mL 1    tretinoin (RETIN-A) 0.025 % topical cream APPLY TOPICALLY TO FACE EVERY DAY AT BEDTIME      Insulin Needles, Disposable, (Karla Pen Needle) 32 gauge x 5/32\" ndle Use to inject testosterone once every 30 days.  10 Pen Needle 1     Allergies   Allergen Reactions    Dairy Aid [Lactase] Nausea and Vomiting     Complete dairy allergy    Penicillins Hives     Past Medical History:   Diagnosis Date    Short for age      Past Surgical History:   Procedure Laterality Date    COLONOSCOPY N/A 11/21/2018    COLONOSCOPY performed by Daryle Counter, MD at [facility] ENDOSCOPY    HX OTHER SURGICAL  4/2/13    sinus sx    HX OTHER SURGICAL      implant     Family History   Problem Relation Age of Onset    No Known Problems Mother     No Known Problems Father     Hypertension Maternal Grandfather     Heart Disease Paternal Grandfather     Asthma Brother     Cancer Paternal Grandmother        ROS  As above  Objective:     Patient-Reported Vitals 10/4/2021   Patient-Reported Weight 160lbs        [INSTRUCTIONS:  \"[x]\" Indicates a positive item  \"[]\" Indicates a negative item  -- DELETE ALL ITEMS NOT EXAMINED]    Constitutional: [x] Appears well-developed and well-nourished [x] No apparent distress      [] Abnormal -     Mental status: [x] Alert and awake  [x] Oriented to person/place/time [x] Able to follow commands    [] Abnormal -     Eyes:   EOM    [x]  Normal    [] Abnormal -   Sclera  [x]  Normal    [] Abnormal -          Discharge [x]  None visible   [] Abnormal -     HENT: [x] Normocephalic, atraumatic  [] Abnormal -   [x] Mouth/Throat: Mucous membranes are moist    External Ears [x] Normal  [] Abnormal -    Neck: [x] No visualized mass [] Abnormal -     Pulmonary/Chest: [x] Respiratory effort normal   [x] No visualized signs of difficulty breathing or respiratory distress        [] Abnormal -      Musculoskeletal:   [x] Normal gait with no signs of ataxia         [x] Normal range of motion of neck        [] Abnormal -     Neurological:        [x] No Facial Asymmetry (Cranial nerve 7 motor function) (limited exam due to video visit)          [x] No gaze palsy        [] Abnormal -          Skin:        [x] No significant exanthematous lesions or discoloration noted on facial skin         [] Abnormal -              Other pertinent observable physical exam findings:-    Exam limited by virtual visit    We discussed the expected course, resolution and complications of the diagnosis(es) in detail. Medication risks, benefits, costs, interactions, and alternatives were discussed as indicated. I advised him to contact the office if his condition worsens, changes or fails to improve as anticipated. He expressed understanding with the diagnosis(es) and plan. Mónica Cox, was evaluated through a synchronous (real-time) audio-video encounter. The patient (or guardian if applicable) is aware that this is a billable service, which includes applicable co-pays. Verbal consent to proceed has been obtained. The visit was conducted pursuant to the emergency declaration under the [facility] [address] authority and the [employer] General Act. Patient identification was verified, and a caregiver was present when appropriate. The patient was located at home in a state where the provider was licensed to provide care.     Steven Paulino MD

## 2023-02-16 ENCOUNTER — TELEPHONE (OUTPATIENT)
Dept: PEDIATRIC ENDOCRINOLOGY | Age: 23
End: 2023-02-16

## 2023-02-16 NOTE — TELEPHONE ENCOUNTER
Dad called to give the fax number for Ras:  [phone]. He will be taking the pt tomorrow to get labs drawn.

## 2023-02-16 NOTE — TELEPHONE ENCOUNTER
Called and spoke with patient (no updated PHI giving office permission to speak with dad). Informed patient that labs would be faxed to [facility]. Patient voiced understanding.

## 2023-05-25 RX ORDER — TESTOSTERONE CYPIONATE 200 MG/ML
INJECTION, SOLUTION INTRAMUSCULAR
COMMUNITY
Start: 2023-02-14

## 2024-06-19 ENCOUNTER — TRANSCRIBE ORDERS (OUTPATIENT)
Facility: HOSPITAL | Age: 24
End: 2024-06-19

## 2024-06-19 DIAGNOSIS — R63.4 ABNORMAL WEIGHT LOSS: Primary | ICD-10-CM

## 2024-07-10 ENCOUNTER — HOSPITAL ENCOUNTER (OUTPATIENT)
Facility: HOSPITAL | Age: 24
Discharge: HOME | End: 2024-07-13
Payer: COMMERCIAL

## 2024-07-10 DIAGNOSIS — R63.4 ABNORMAL WEIGHT LOSS: ICD-10-CM

## 2024-07-10 PROCEDURE — 74150 CT ABDOMEN W/O CONTRAST: CPT

## 2024-12-19 NOTE — DISCHARGE INSTRUCTIONS
[facility].  [address] [place]   [id number]  2000    EGD and Colonoscopy (Double procedure) DISCHARGE INSTRUCTIONS    Discomfort:  Redness at IV site- apply warm compress to area; if redness or soreness persist- contact your physician  There may be a slight amount of blood passed from the rectum  Gaseous discomfort- walking, belching will help relieve any discomfort    DIET:  Clear liquid diet and advance as tolerated. remember your colon is empty and a heavy meal will produce gas. Avoid these foods:  vegetables, fried / greasy foods, carbonated drinks for today    MEDICATIONS:    Resume home medications     ACTIVITY:  Responsible adult should stay with child today. You may resume your normal daily activities it is recommended that you spend the remainder of the day resting -  avoid any strenuous activity. CALL M.D. ANY SIGN OF:   Increasing pain, nausea, vomiting  Abdominal distension (swelling)  Significant rectal bleeding  Fever (chills)       Follow-up Instructions:  Call [facility] if any questions or problems. Telephone # [phone] Activation    Thank you for requesting access to [address] [place] [address]. Please follow the instructions below to securely access and download your online medical record. [employer] allows you to send messages to your doctor, view your test results, renew your prescriptions, schedule appointments, and more. How Do I Sign Up? 1. In your internet browser, go to [email or web address]  2. Click on the First Time User? Click Here link in the Sign In box. You will be redirect to the New Member Sign Up page. 3. Enter your [employer] Access Code exactly as it appears below. You will not need to use this code after youve completed the sign-up process. If you do not sign up before the expiration date, you must request a new code.     Brain[employer] Access Code: Hip Arthroscopy Postoperative Instructions       Diet  Begin with clear liquids and light foods (jello, soup, etc)  Progress to your normal diet if you are not nauseated    Wound Care  Maintain your operative dressing, loosen bandage if swelling occurs  It is normal to have some bleeding or oozing from the surgical sites due to fluid irrigation during the surgery.  Please change the dressing as needed.  Removal surgical dressing on the third post-operative day. If minimal drainage is present, apply bandaids over incisions and change daily.  Do not apply bacitracin or other ointments to the incisions.  You can remove TERRI hose (long white socks) on the third post operative day  To avoid infection, keep incisions clean and dry.  You can shower 2 days post op.  MUST KEEP THE INCISIONS DRY.  NO immersion of the leg into a bath/pool etc.    Ice Therapy  Begin immediately after surgery  Use ice machine continuously or ice packs every 2 hours for 20 minutes daily.  Do not place the wrap or ice packs directly onto skin.     Activity  Elevate the operative leg to chest level whenever possible to decrease swelling  Do not place pillows under the knee, but rather place a pillow under the foot/ankle if needed  Use Crutches for ambulation.  Weight bearing will be determined by the procedure  Avoid long periods of sitting without the leg elevated or long distance travel for 2 weeks  No driving until discussed at your first post-operative appointment  May return to sedentary work or school once you have discontinued narcotic pain medication    Brace  Your brace should be worn at all times but can be removed for hygiene and physical therapy     Exercise  Do ankle pumps continuously throughout the day to reduce the possibility of a blood clot in your calf  Formal physical therapy will begin post-operative day #1      Medications  Pain medication is injected in the wound and hip during surgery.  This will wear off within 8-12 hours.    You may have received a nerve block prior to surgery. If so, this will wear off within 24-36 hours.  Most patients require narcotic pain medication for a short period of time after surgery.  Common side effects include nausea, drowsiness and constipation.  To decrease side effects take these medications with food. If constipation occurs, you can utilize over the counter Colace or Miralax.  If you are having problems with nausea and vomiting, contact the office to discuss.  Do not drive a car or operate machinery while taking narcotic pain medication.  The following medications are enclosed to use post-operatively  Percocet (Oxycodone with Acetaminophen) for pain control  Indocin (Indomethacin) for heterotopic bone prophylaxis.  **MAKE SURE THIS MEDICATION IS FILLED AND TAKEN AS DIRECTED**  Baby aspirin twice a day to help reduce the risk of a blood clot  Zofran (Ondansetron) as needed for nausea  Robaxin (Methocarbamol) as needed for spasms should they occur  Naproxen to take as needed for pain after you complete the Indocin    Contact information  Our office phone is [phone].  Contact the office with any of the following  Painful swelling or numbness  Unrelenting pain  Fever over 101° or chills  Redness around incision  Continuous drainage or bleeding from the incision. A small amount is to be expected)  Color change in the leg  Trouble breathing  Excessive nausea or vomiting  If you have an emergency after hours on the weekend, please call [phone] to reach the answering service who will contact Dr. Norton  If you have an emergency that requires immediate attention, proceed to the nearest emergency room or call [phone].    Follow up  Your first post operative appointment should be scheduled around 14 days after surgery. Contact the office at [phone] if this has not yet been set up.      [id number]  Expires: 2019  9:50 AM (This is the date your [employer] access code will )    4. Enter the last four digits of your Social Security Number (xxxx) and Date of Birth (mm/dd/yyyy) as indicated and click Submit. You will be taken to the next sign-up page. 5. Create a Musicane ID. This will be your Music[employer] login ID and cannot be changed, so think of one that is secure and easy to remember. 6. Create a Music[employer] password. You can change your password at any time. 7. Enter your Password Reset Question and Answer. This can be used at a later time if you forget your password. 8. Enter your e-mail address. You will receive e-mail notification when new information is available in [address] 9. Click Sign Up. You can now view and download portions of your medical record. 10. Click the Download Summary menu link to download a portable copy of your medical information. Additional Information    If you have questions, please visit the Frequently Asked Questions section of the [employer] website at [email or web address] Remember, [employer] is NOT to be used for urgent needs. For medical emergencies, dial 911.

## (undated) DEVICE — CONNECTOR TBNG AUX H2O JET DISP FOR OLY 160/180 SER

## (undated) DEVICE — BAG SPEC BIOHZD LF 2MIL 6X10IN -- CONVERT TO ITEM 357326

## (undated) DEVICE — 1200 GUARD II KIT W/5MM TUBE W/O VAC TUBE: Brand: GUARDIAN

## (undated) DEVICE — KIT IV STRT W CHLORAPREP PD 1ML

## (undated) DEVICE — BAG BELONG PT PERS CLEAR HANDL

## (undated) DEVICE — Z DISCONTINUED NO SUB IDED SET EXTN W/ 4 W STPCOCK M SPIN LOK 36IN

## (undated) DEVICE — SET ADMIN 16ML TBNG L100IN 2 Y INJ SITE IV PIGGY BK DISP

## (undated) DEVICE — WRISTBAND ID AD W1XL11.5IN RED POLY ALRG PREPRINTED PERM

## (undated) DEVICE — KENDALL RADIOLUCENT FOAM MONITORING ELECTRODE -RECTANGULAR SHAPE: Brand: KENDALL

## (undated) DEVICE — AIRLIFE™ U/CONNECT-IT OXYGEN TUBING 7 FEET (2.1 M) CRUSH-RESISTANT OXYGEN TUBING, VINYL TIPPED: Brand: AIRLIFE™

## (undated) DEVICE — CATH IV AUTOGRD BC BLU 22GA 25 -- INSYTE

## (undated) DEVICE — CONTAINER SPEC 20 ML LID NEUT BUFF FORMALIN 10 % POLYPR STS

## (undated) DEVICE — CUFF BLD PRSS AD SM SZ 10 FOR 20-26CM LIMB VLY SFT W/O TUBE

## (undated) DEVICE — CANN NASAL O2 CAPNOGRAPHY AD -- FILTERLINE

## (undated) DEVICE — ENDO CARRY-ON PROCEDURE KIT INCLUDES ENZYMATIC SPONGE, GAUZE, BIOHAZARD LABEL, TRAY, LUBRICANT, DIRTY SCOPE LABEL, WATER LABEL, TRAY, DRAWSTRING PAD, AND DEFENDO 4-PIECE KIT.: Brand: ENDO CARRY-ON PROCEDURE KIT

## (undated) DEVICE — BITE BLK ENDOSCP AD 54FR GRN POLYETH ENDOSCP W STRP SLD

## (undated) DEVICE — BW-412T DISP COMBO CLEANING BRUSH: Brand: SINGLE USE COMBINATION CLEANING BRUSH

## (undated) DEVICE — SOLIDIFIER FLUID 3000 CC ABSORB

## (undated) DEVICE — FORCEPS BX L240CM JAW DIA2.8MM L CAP W/ NDL MIC MESH TOOTH

## (undated) DEVICE — Z DISCONTINUED USE 2751540 TUBING IRRIG L10IN DISP PMP ENDOGATOR

## (undated) DEVICE — NEEDLE HYPO 18GA L1.5IN PNK S STL HUB POLYPR SHLD REG BVL

## (undated) DEVICE — NEONATAL-ADULT SPO2 SENSOR: Brand: NELLCOR

## (undated) DEVICE — SYRINGE MED 20ML STD CLR PLAS LUERLOCK TIP N CTRL DISP

## (undated) DEVICE — QUILTED PREMIUM COMFORT UNDERPAD,EXTRA HEAVY: Brand: WINGS